# Patient Record
Sex: MALE | Race: WHITE | NOT HISPANIC OR LATINO | Employment: FULL TIME | URBAN - METROPOLITAN AREA
[De-identification: names, ages, dates, MRNs, and addresses within clinical notes are randomized per-mention and may not be internally consistent; named-entity substitution may affect disease eponyms.]

---

## 2019-03-28 ENCOUNTER — OFFICE VISIT (OUTPATIENT)
Dept: SLEEP MEDICINE | Facility: HOSPITAL | Age: 29
End: 2019-03-28

## 2019-03-28 VITALS
HEART RATE: 114 BPM | BODY MASS INDEX: 39.39 KG/M2 | SYSTOLIC BLOOD PRESSURE: 113 MMHG | HEIGHT: 67 IN | WEIGHT: 251 LBS | DIASTOLIC BLOOD PRESSURE: 73 MMHG

## 2019-03-28 DIAGNOSIS — R06.83 SNORING: ICD-10-CM

## 2019-03-28 DIAGNOSIS — G47.10 HYPERSOMNIA: Primary | ICD-10-CM

## 2019-03-28 DIAGNOSIS — Z02.4 ENCOUNTER FOR CDL (COMMERCIAL DRIVING LICENSE) EXAM: ICD-10-CM

## 2019-03-28 DIAGNOSIS — G47.26 SHIFT WORK SLEEP DISORDER: ICD-10-CM

## 2019-03-28 DIAGNOSIS — E66.9 OBESITY (BMI 30-39.9): ICD-10-CM

## 2019-03-28 PROCEDURE — G0463 HOSPITAL OUTPT CLINIC VISIT: HCPCS

## 2019-03-28 NOTE — PROGRESS NOTES
"[facility]  Telephone: [phone] / Fax: [phone] [place]  Telephone: [phone] / Fax: [phone] Anupama Victor    Referring Physician: Provider, No Known  PCP: Provider, No Known    Reason for consult:  sleep apnea    Uriel Mathew is a 29 y.o.male  was seen in the Sleep Disorders Center today for evaluation of sleep apnea.  He was tested for FAB 10 yrs ago at [facility] over 20 per hour. He used the machine for about 1 year. He stopped the machine due to excessive pressures. He had FFM. Since then he gained about 20 lbs. 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  His ESS is 8    Over the years he had some episodes of sleepiness. However, most of the time he feels rested. He gets about 6 hours of sleep per night. He sleeps from 6pm-MN. He starts his job at 1am. He drives locally. He snores in all positions which appear to be loud. He denies gasping respirations or choking.  He feels better if he increases sleep time.    Social history, , former smoker age [age], drinks 2 caffeinated beverages a day.    Review of systems unremarkable    Uriel Mathew     Current Medications:  No current outpatient medications on file.    I have reviewed Past Medical History, Past Surgical History, Medication List, Social History and Family History as entered in Sleep Questionnaire and EPIC.    ESS  8   Vital Signs /73 (BP Location: Left arm, Patient Position: Sitting)   Pulse 114   Ht 170.2 cm (67\")   Wt 114 kg (251 lb)   BMI 39.31 kg/m²  Body mass index is 39.31 kg/m².    General Alert and oriented. No acute distress noted   Pharynx/Throat Class II Mallampati airway, large tongue, no evidence of redundant lateral pharyngeal tissue. No oral lesions. No thrush. Moist mucous membranes.   Head Normocephalic. Symmetrical. Atraumatic.    Nose No septal deviation. No drainage   Chest Wall " Normal shape. Symmetric expansion with respiration. No tenderness.   Neck Trachea midline, no thyromegaly or adenopathy    Lungs Clear to auscultation bilaterally. No wheezes. No rhonchi. No rales. Respirations regular, even and unlabored.   Heart Regular rhythm and normal rate. Normal S1 and S2. No murmur   Abdomen Soft, non-tender and non-distended. Normal bowel sounds. No masses.   Extremities Moves all extremities well. No edema   Psychiatric Normal mood and affect.             .     Impression:  1. Hypersomnia    2. Snoring    3. Obesity (BMI 30-39.9)    4. Encounter for CDL (commercial driving license) exam    5. Shift work sleep disorder          Plan:  I discussed the pathophysiology of obstructive sleep apnea with the patient.  We discussed the adverse outcomes associated with untreated sleep-disordered breathing.  We discussed treatment modalities of obstructive sleep apnea including CPAP device as well as oral mandibular advancement device. Sleep study will be scheduled to establish definitive diagnosis of sleep disorder breathing.  Weight loss will be strongly beneficial in order to reduce the severity of sleep-disordered breathing.  Patient has narrow oropharyngeal structure.  Caution during activities that require prolonged concentration is strongly advised.  Patient will be notified of sleep study results after sleep study is completed.  If sleep apnea is only mild,  oral mandibular advancement device may be one of the treatment options.  However if sleep apnea is moderately severe, CPAP treatment will be strongly encouraged.  The patient is not opposed to treatment with CPAP device if we confirm significant obstructive sleep apnea on polysomnography.  I discussed with the patient the importance of obstructive sleep apnea treatment as he has  license.      Thank you for allowing me to participate in your patient's care.    The patient will follow up with Dr. Mauricio after completion of  RITCHIE Brink  [facility]  Phone: [phone]      Part of this note may be an electronic transcription/translation of spoken language to printed text using the Dragon Dictation System. Some errors may exist even though the document was edited.

## 2019-04-16 ENCOUNTER — HOSPITAL ENCOUNTER (OUTPATIENT)
Dept: SLEEP MEDICINE | Facility: HOSPITAL | Age: 29
Discharge: HOME | End: 2019-04-16
Admitting: NURSE PRACTITIONER

## 2019-04-16 DIAGNOSIS — G47.10 HYPERSOMNIA: ICD-10-CM

## 2019-04-16 DIAGNOSIS — E66.9 OBESITY (BMI 30-39.9): ICD-10-CM

## 2019-04-16 DIAGNOSIS — R06.83 SNORING: ICD-10-CM

## 2019-04-16 PROCEDURE — 95806 SLEEP STUDY UNATT&RESP EFFT: CPT

## 2019-04-24 ENCOUNTER — TELEPHONE (OUTPATIENT)
Dept: SLEEP MEDICINE | Facility: HOSPITAL | Age: 29
End: 2019-04-24

## 2019-05-30 ENCOUNTER — OFFICE VISIT (OUTPATIENT)
Dept: SLEEP MEDICINE | Facility: HOSPITAL | Age: 29
End: 2019-05-30

## 2019-05-30 VITALS
HEART RATE: 82 BPM | HEIGHT: 67 IN | SYSTOLIC BLOOD PRESSURE: 122 MMHG | OXYGEN SATURATION: 97 % | DIASTOLIC BLOOD PRESSURE: 70 MMHG | WEIGHT: 241 LBS | BODY MASS INDEX: 37.83 KG/M2

## 2019-05-30 DIAGNOSIS — G47.26 SHIFT WORK SLEEP DISORDER: ICD-10-CM

## 2019-05-30 DIAGNOSIS — Z02.4 ENCOUNTER FOR CDL (COMMERCIAL DRIVING LICENSE) EXAM: ICD-10-CM

## 2019-05-30 DIAGNOSIS — R06.83 SNORING: Primary | ICD-10-CM

## 2019-05-30 DIAGNOSIS — E66.9 OBESITY (BMI 30-39.9): ICD-10-CM

## 2019-05-30 PROCEDURE — G0463 HOSPITAL OUTPT CLINIC VISIT: HCPCS

## 2019-05-30 NOTE — PROGRESS NOTES
"[facility]  Telephone: [phone] / Fax: [phone] [place]  Telephone: [phone] / Fax: [phone] Anupama Victor    PCP: Provider, No Known    Reason for visit: FAB f/u    Uriel Mathew is a 29 y.o.male  was last seen at [facility] sleep lab in March 2019 and had HST in April 2019. It showed no FAB. 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 He denies EDS on the road.  He denies other health changes.  His sleep schedule is 6 PM-1 AM.  He works nights.  His ESS is 1.    Social history, no tobacco, drinks 1 energy drink, 1 caffeine.    Review of systems, negative.    Current Medications:  No current outpatient medications on file.   also entered in Sleep Questionnaire    Patient  has no past medical history on file.    I have reviewed the Past Medical History, Past Surgical History, Social History and Family History.            ESS  1   Vital Signs /70   Pulse 82   Ht 170.2 cm (67\")   Wt 109 kg (241 lb)   SpO2 97%   BMI 37.75 kg/m²  Body mass index is 37.75 kg/m².    General Alert and oriented. No acute distress noted   Pharynx/Throat Class II Mallampati airway, large tongue, no evidence of redundant lateral pharyngeal tissue. No oral lesions. No thrush. Moist mucous membranes.   Head Normocephalic. Symmetrical. Atraumatic.    Nose No septal deviation. No drainage   Chest Wall Normal shape. Symmetric expansion with respiration. No tenderness.   Neck Trachea midline, no thyromegaly or adenopathy    Lungs Clear to auscultation bilaterally. No wheezes. No rhonchi. No rales. Respirations regular, even and unlabored.   Heart Regular rhythm and normal rate. Normal S1 and S2. No murmur   Abdomen Soft, non-tender and non-distended. Normal bowel sounds. No masses.   Extremities Moves all extremities well. No edema   Psychiatric Normal mood and affect.     Testing:  Diagnostic findings: HST 4/18/19- The patient tolerated the home sleep testing with " monitoring time of 286 minutes. The data obtained make this a technically adequate study. The apnea hypopneas index(AHI) was 2.9 per sleep hour.  The AHI during supine position was 3.4 per sleep hour. Mean heart rate of 80 BPM.  Snoring was noted 25% of sleep time. Lowest oxygen saturation during the study was 88%. Saturation below 89% was noted for 0 mins.     Impression:  1. Snoring    2. Obesity (BMI 30-39.9)    3. Encounter for CDL (commercial driving license) exam    4. Shift work sleep disorder          Plan:  I discussed results of home sleep study with the patient.  He has no sleep apnea or sleep-related hypoxemia.  He works nights and needs to exercise extreme caution during activities that require prolonged concentration such as driving especially at night.  We have no objection for him to continue maintaining  license.  I have given him a statement outlining this to provide to the DOT.  I have also given him a copy of sleep study report to keep for his records       Follow up with Dr. Hang TORRES    Thank you for allowing me to participate in your patient's care.      RITCHIE Disla  [facility]  Phone: [phone]      Part of this note may be an electronic transcription/translation of spoken language to printed text using the Dragon Dictation System.

## 2021-07-30 ENCOUNTER — OFFICE VISIT (OUTPATIENT)
Dept: INTERNAL MEDICINE | Facility: CLINIC | Age: 31
End: 2021-07-30

## 2021-07-30 VITALS
TEMPERATURE: 98.6 F | HEART RATE: 94 BPM | BODY MASS INDEX: 38.61 KG/M2 | OXYGEN SATURATION: 96 % | DIASTOLIC BLOOD PRESSURE: 70 MMHG | SYSTOLIC BLOOD PRESSURE: 114 MMHG | HEIGHT: 67 IN | RESPIRATION RATE: 18 BRPM | WEIGHT: 246 LBS

## 2021-07-30 DIAGNOSIS — Z00.00 ENCOUNTER FOR WELLNESS EXAMINATION IN ADULT: Primary | ICD-10-CM

## 2021-07-30 PROCEDURE — 99395 PREV VISIT EST AGE 18-39: CPT | Performed by: NURSE PRACTITIONER

## 2021-07-30 RX ORDER — ALBUTEROL SULFATE 90 UG/1
2 AEROSOL, METERED RESPIRATORY (INHALATION)
COMMUNITY
Start: 2021-02-02 | End: 2021-07-30

## 2021-07-30 NOTE — PROGRESS NOTES
"ANUP Trevino is a 31 y.o. male presenting for Annual Exam and Abdominal Pain (Needs referral to gastro )  He is a new patient to me.    His current/chronic health conditions include:  There is no problem list on file for this patient.      Current Outpatient Medications on File Prior to Visit   Medication Sig   • [DISCONTINUED] albuterol sulfate  (90 Base) MCG/ACT inhaler Inhale 2 puffs.     No current facility-administered medications on file prior to visit.            Health Habits:  Nutrition: Trying to get back on track  Exercise: 0 times/week.  Current exercise activities include: none    Screenings:  Dental Exam: NUTD  Eye Exam: UTD  PSA: n/a  Colon Cancer: n/a  Tob use: briefly, previously      Complaints today include:  He c/o abd discomfort and indigestion. Denies GERD/N/V. Rare diarrhea.This was better when he was eating better.    The patient's allergies, current medications, problem list, past medical history, past family history, past medical history, and past social history were reviewed and updated as appropriate.    Review of Systems   Constitutional: Negative.    HENT: Negative.    Eyes: Negative.    Respiratory: Negative.    Cardiovascular: Negative.    Gastrointestinal: Positive for diarrhea. Negative for abdominal pain, nausea and vomiting.        Indigestion   Endocrine: Negative.    Genitourinary: Negative.    Musculoskeletal: Negative.    Skin: Negative.    Allergic/Immunologic: Negative.    Neurological: Negative.    Hematological: Negative.    Psychiatric/Behavioral: Negative.        OBJECTIVE    Vitals:    07/30/21 1438   BP: 114/70   Pulse: 94   Resp: 18   Temp: 98.6 °F (37 °C)   TempSrc: Infrared   SpO2: 96%   Weight: 112 kg (246 lb)   Height: 170.2 cm (67.01\")       BP Readings from Last 3 Encounters:   07/30/21 114/70   05/30/19 122/70   03/28/19 113/73       Wt Readings from Last 3 Encounters:   07/30/21 112 kg (246 lb)   05/30/19 109 kg (241 lb)   03/28/19 114 kg (251 " lb)       Body mass index is 38.52 kg/m².  Nursing notes and vital signs reviewed.    Physical Exam  Constitutional:       General: He is not in acute distress.     Appearance: Normal appearance. He is well-developed.   HENT:      Head: Normocephalic.      Right Ear: Hearing, tympanic membrane, ear canal and external ear normal.      Left Ear: Hearing, tympanic membrane, ear canal and external ear normal.      Nose: Nose normal. No mucosal edema or rhinorrhea.      Mouth/Throat:      Mouth: Mucous membranes are moist.      Pharynx: Oropharynx is clear. Uvula midline.   Eyes:      General: Lids are normal.      Extraocular Movements: Extraocular movements intact.      Conjunctiva/sclera: Conjunctivae normal.      Pupils: Pupils are equal, round, and reactive to light.   Neck:      Thyroid: No thyroid mass or thyromegaly.   Cardiovascular:      Rate and Rhythm: Regular rhythm.      Pulses: Normal pulses.      Heart sounds: S1 normal and S2 normal. No murmur heard.   No friction rub. No gallop.    Pulmonary:      Effort: Pulmonary effort is normal.      Breath sounds: Normal breath sounds. No wheezing, rhonchi or rales.   Abdominal:      General: Bowel sounds are normal.      Palpations: Abdomen is soft.      Tenderness: There is no abdominal tenderness. There is no guarding.      Hernia: No hernia is present.   Musculoskeletal:         General: No deformity. Normal range of motion.      Cervical back: Normal range of motion and neck supple.   Lymphadenopathy:      Cervical: No cervical adenopathy.   Skin:     General: Skin is warm and dry.      Findings: No lesion or rash.   Neurological:      General: No focal deficit present.      Mental Status: He is alert and oriented to person, place, and time.      Cranial Nerves: No cranial nerve deficit.      Sensory: No sensory deficit.      Motor: Motor function is intact.      Coordination: Coordination is intact.      Gait: Gait normal.      Deep Tendon Reflexes: Reflexes  are normal and symmetric.   Psychiatric:         Attention and Perception: He is attentive.         Mood and Affect: Mood and affect normal.         Speech: Speech normal.         Behavior: Behavior normal.         Thought Content: Thought content normal.           No results found for this or any previous visit (from the past 672 hour(s)).      ASSESSMENT AND PLAN    Diagnoses and all orders for this visit:    1. Encounter for wellness examination in adult (Primary)  -     CBC (No Diff)  -     Comprehensive Metabolic Panel  -     Hepatitis C Antibody  -     Lipid Panel With / Chol / HDL Ratio  -     Thyroid Cascade Profile  -     Vitamin D 25 Hydroxy          Preventative counseling completed including relevant screenings, appropriate vaccinations, healthy nutrition, and appropriate physical activity.  Patient's Body mass index is 38.52 kg/m². indicating that he is obese (BMI >30). Obesity-related health conditions include the following: none. Obesity is newly identified. BMI is is above average; BMI management plan is completed. We discussed portion control and increasing exercise..          The plan of care was discussed. All questions were answered. Patient verbalized understanding.        Return for fasting labs ASAP.

## 2021-09-17 ENCOUNTER — HOSPITAL ENCOUNTER (OUTPATIENT)
Dept: GENERAL RADIOLOGY | Facility: HOSPITAL | Age: 31
Discharge: HOME | End: 2021-09-17
Admitting: NURSE PRACTITIONER

## 2021-09-17 DIAGNOSIS — M79.652 LEFT THIGH PAIN: ICD-10-CM

## 2021-09-17 PROCEDURE — 73552 X-RAY EXAM OF FEMUR 2/>: CPT

## 2022-01-07 ENCOUNTER — TELEMEDICINE (OUTPATIENT)
Dept: FAMILY MEDICINE CLINIC | Facility: TELEHEALTH | Age: 32
End: 2022-01-07

## 2022-01-07 DIAGNOSIS — Z20.822 CLOSE EXPOSURE TO COVID-19 VIRUS: ICD-10-CM

## 2022-01-07 DIAGNOSIS — J06.9 VIRAL UPPER RESPIRATORY TRACT INFECTION: Primary | ICD-10-CM

## 2022-01-07 PROCEDURE — 99213 OFFICE O/P EST LOW 20 MIN: CPT | Performed by: NURSE PRACTITIONER

## 2022-01-07 NOTE — PATIENT INSTRUCTIONS
Symptoms may worsen over the next several days.   I have included a COVID-19 test. Go to your nearest [facility] for testing. Tell them you have already been seen virtually and only need testing   We will notify you of your COVID-19 results by phone. You will receive a call from an unknown or blocked number. You can also get your results on your Momail darius.  You will need to remain quarantined for 10 days from onset of symptoms and only to discontinue if symptoms have improved and no fever for 24 hours without the use of fever reducing medications such as Tylenol (acetaminophen), Advil (ibuprfen), or Aleve (naproxen).  Continue to wear a mask for 14 days or until symptoms resolve. If symptoms worsen, go to Urgent Care or Emergency Department for care.   Symptoms of Coronavirus are treated just as you would for any viral illness. Take Tylenol and/or Ibuprofen for pain and/or fever, you may find it better to alternate these every 4 hours, so that you are only taking each every 8 hours. Stay hydrated, rest and you may treat cough with over-the-counter cough syrup such as Robitussin.  If your symptoms do not improve, symptoms change or do not fully resolve, seek further evaluation with your primary care provider or Urgent Care. If you develop severe symptoms, such as chest pain, high fever, difficulty breathing, difficulty urinating or have any symptoms that interfere with your ability to function go to the nearest Emergency Department immediately.     Upper Respiratory Infection, Adult  An upper respiratory infection (URI) is a common viral infection of the nose, throat, and upper air passages that lead to the lungs. The most common type of URI is the common cold. URIs usually get better on their own, without medical treatment.  What are the causes?  A URI is caused by a virus. You may catch a virus by:  · Breathing in droplets from an infected person's cough or sneeze.  · Touching something that has  been exposed to the virus (contaminated) and then touching your mouth, nose, or eyes.  What increases the risk?  You are more likely to get a URI if:  · You are very young or very old.  · It is noemi or winter.  · You have close contact with others, such as at a , school, or health care facility.  · You smoke.  · You have long-term (chronic) heart or lung disease.  · You have a weakened disease-fighting (immune) system.  · You have nasal allergies or asthma.  · You are experiencing a lot of stress.  · You work in an area that has poor air circulation.  · You have poor nutrition.  What are the signs or symptoms?  A URI usually involves some of the following symptoms:  · Runny or stuffy (congested) nose.  · Sneezing.  · Cough.  · Sore throat.  · Headache.  · Fatigue.  · Fever.  · Loss of appetite.  · Pain in your forehead, behind your eyes, and over your cheekbones (sinus pain).  · Muscle aches.  · Redness or irritation of the eyes.  · Pressure in the ears or face.  How is this diagnosed?  This condition may be diagnosed based on your medical history and symptoms, and a physical exam. Your health care provider may use a cotton swab to take a mucus sample from your nose (nasal swab). This sample can be tested to determine what virus is causing the illness.  How is this treated?  URIs usually get better on their own within 7-10 days. You can take steps at home to relieve your symptoms. Medicines cannot cure URIs, but your health care provider may recommend certain medicines to help relieve symptoms, such as:  · Over-the-counter cold medicines.  · Cough suppressants. Coughing is a type of defense against infection that helps to clear the respiratory system, so take these medicines only as recommended by your health care provider.  · Fever-reducing medicines.  Follow these instructions at home:  Activity  · Rest as needed.  · If you have a fever, stay home from work or school until your fever is gone or until your  health care provider says you are no longer contagious. Your health care provider may have you wear a face mask to prevent your infection from spreading.  Relieving symptoms  · Gargle with a salt-water mixture 3-4 times a day or as needed. To make a salt-water mixture, completely dissolve ½-1 tsp of salt in 1 cup of warm water.  · Use a cool-mist humidifier to add moisture to the air. This can help you breathe more easily.  Eating and drinking    · Drink enough fluid to keep your urine pale yellow.  · Eat soups and other clear broths.    General instructions    · Take over-the-counter and prescription medicines only as told by your health care provider. These include cold medicines, fever reducers, and cough suppressants.  · Do not use any products that contain nicotine or tobacco, such as cigarettes and e-cigarettes. If you need help quitting, ask your health care provider.  · Stay away from secondhand smoke.  · Stay up to date on all immunizations, including the yearly (annual) flu vaccine.  · Keep all follow-up visits as told by your health care provider. This is important.    How to prevent the spread of infection to others    · URIs can be passed from person to person (are contagious). To prevent the infection from spreading:  ? Wash your hands often with soap and water. If soap and water are not available, use hand .  ? Avoid touching your mouth, face, eyes, or nose.  ? Cough or sneeze into a tissue or your sleeve or elbow instead of into your hand or into the air.    Contact a health care provider if:  · You are getting worse instead of better.  · You have a fever or chills.  · Your mucus is brown or red.  · You have yellow or brown discharge coming from your nose.  · You have pain in your face, especially when you bend forward.  · You have swollen neck glands.  · You have pain while swallowing.  · You have white areas in the back of your throat.  Get help right away if:  · You have shortness of  breath that gets worse.  · You have severe or persistent:  ? Headache.  ? Ear pain.  ? Sinus pain.  ? Chest pain.  · You have chronic lung disease along with any of the following:  ? Wheezing.  ? Prolonged cough.  ? Coughing up blood.  ? A change in your usual mucus.  · You have a stiff neck.  · You have changes in your:  ? Vision.  ? Hearing.  ? Thinking.  ? Mood.  Summary  · An upper respiratory infection (URI) is a common infection of the nose, throat, and upper air passages that lead to the lungs.  · A URI is caused by a virus.  · URIs usually get better on their own within 7-10 days.  · Medicines cannot cure URIs, but your health care provider may recommend certain medicines to help relieve symptoms.  This information is not intended to replace advice given to you by your health care provider. Make sure you discuss any questions you have with your health care provider.  Document Revised: 12/26/2019 Document Reviewed: 08/03/2018  [employer] Patient Education © 2021 [employer]

## 2022-01-07 NOTE — PROGRESS NOTES
You have chosen to receive care through a telehealth visit.  Do you consent to use a video/audio connection for your medical care today? Yes     CHIEF COMPLAINT  No chief complaint on file.        HPI  Uriel Mathew is a 31 y.o. male  presents with complaint of   His son has COVID-19.     Review of Systems   Constitutional: Positive for fatigue. Negative for chills, diaphoresis and fever.   HENT: Positive for congestion, rhinorrhea, sinus pressure and sore throat. Negative for sinus pain and sneezing.    Respiratory: Negative for cough, chest tightness, shortness of breath and wheezing.    Cardiovascular: Negative for chest pain.   Gastrointestinal: Negative for diarrhea, nausea and vomiting.   Musculoskeletal: Positive for myalgias.   Neurological: Positive for headaches.       History reviewed. No pertinent past medical history.    Family History   Problem Relation Age of Onset   • Parkinsonism Maternal Grandmother    • No Known Problems Mother    • No Known Problems Father    • Dementia Maternal Grandfather        Social History     Socioeconomic History   • Marital status:    Tobacco Use   • Smoking status: Former Smoker   • Smokeless tobacco: Never Used   • Tobacco comment: Smoked for 2 years   Vaping Use   • Vaping Use: Never used   Substance and Sexual Activity   • Alcohol use: Yes     Comment: socially   • Drug use: Not Currently     Types: Marijuana     Comment: College   • Sexual activity: Yes     Partners: Female     Birth control/protection: Condom         There were no vitals taken for this visit.    PHYSICAL EXAM  Physical Exam   Constitutional: He is oriented to person, place, and time. He appears well-developed and well-nourished. He does not have a sickly appearance. He does not appear ill. No distress.   HENT:   Head: Normocephalic and atraumatic.   Eyes: EOM are normal.   Neck: Neck normal appearance.  Pulmonary/Chest: Effort normal.  No respiratory distress.  Neurological: He is alert and  oriented to person, place, and time.   Skin: Skin is dry.   Psychiatric: He has a normal mood and affect.       No results found for this or any previous visit.    Diagnoses and all orders for this visit:    1. Viral upper respiratory tract infection (Primary)  -     COVID-19,[facility] ROUTINE, NP/OP SWAB IN TRANSPORT MEDIA OR ESWAB 72 HR TAT - Swab, Nasopharynx; Future  -     QUESTIONNAIRE SERIES    2. Close exposure to COVID-19 virus  -     COVID-19,[facility] ROUTINE, NP/OP SWAB IN TRANSPORT MEDIA OR ESWAB 72 HR TAT - Swab, Nasopharynx; Future  -     QUESTIONNAIRE SERIES      COVID-19 (PFIZER) 4/21/2021, 3/25/2021         FOLLOW-UP  As discussed during visit with PCP/[facility] if no improvement or Urgent Care/Emergency Department if worsening of symptoms    Patient verbalizes understanding of medication dosage, comfort measures, instructions for treatment and follow-up.    Liset Nichols, RITCHIE  01/07/2022  11:13 EST    This visit was performed via Telehealth.  This patient has been instructed to follow-up with their primary care provider if their symptoms worsen or the treatment provided does not resolve their illness.

## 2022-03-10 ENCOUNTER — OFFICE VISIT (OUTPATIENT)
Dept: INTERNAL MEDICINE | Facility: CLINIC | Age: 32
End: 2022-03-10

## 2022-03-10 VITALS
SYSTOLIC BLOOD PRESSURE: 129 MMHG | OXYGEN SATURATION: 97 % | BODY MASS INDEX: 39.96 KG/M2 | HEIGHT: 67 IN | WEIGHT: 254.6 LBS | DIASTOLIC BLOOD PRESSURE: 73 MMHG | HEART RATE: 86 BPM | TEMPERATURE: 97.8 F

## 2022-03-10 DIAGNOSIS — S39.012A STRAIN OF LUMBAR REGION, INITIAL ENCOUNTER: Primary | ICD-10-CM

## 2022-03-10 PROCEDURE — 99213 OFFICE O/P EST LOW 20 MIN: CPT | Performed by: NURSE PRACTITIONER

## 2022-03-10 RX ORDER — METHYLPREDNISOLONE 4 MG/1
TABLET ORAL
Qty: 21 EACH | Refills: 0 | Status: SHIPPED | OUTPATIENT
Start: 2022-03-10 | End: 2022-06-20

## 2022-03-10 RX ORDER — CYCLOBENZAPRINE HCL 5 MG
5 TABLET ORAL 3 TIMES DAILY PRN
Qty: 30 TABLET | Refills: 0 | Status: SHIPPED | OUTPATIENT
Start: 2022-03-10 | End: 2022-03-20

## 2022-03-10 NOTE — PROGRESS NOTES
"Uriel Mathew is a 32 y.o. male presenting today for   Chief Complaint   Patient presents with   • Back Pain     Injured 3/9/22 when picking up a heavy object at work.       Subjective    History of Present Illness     He was setting down a display rack, <20#, and then upon standing felt a sudden grabbing in his lower back. This occurred yesterday in the afternoon and has been progressively worsening. He has tried ice and ibu.  He has prior h/o lumbar pain. No prior x-rays or PT.  No LE paresthesia or saddle anesthesia.    The following portions of the patient's history were reviewed and updated as appropriate: allergies, current medications, problem list, past medical history, past surgical history, family history, and social history.          Objective    Vitals:    03/10/22 1049   BP: 129/73   Pulse: 86   Temp: 97.8 °F (36.6 °C)   TempSrc: Temporal   SpO2: 97%   Weight: 115 kg (254 lb 9.6 oz)   Height: 170.2 cm (67\")     Body mass index is 39.88 kg/m².  Nursing notes and vitals reviewed.    Physical Exam  Constitutional:       General: He is not in acute distress.     Appearance: He is well-developed.   Pulmonary:      Effort: Pulmonary effort is normal. No respiratory distress.   Musculoskeletal:      Cervical back: Normal.      Thoracic back: Normal.      Lumbar back: No swelling, deformity or tenderness. Decreased range of motion.   Neurological:      Mental Status: He is alert and oriented to person, place, and time.   Psychiatric:         Speech: Speech normal.         Thought Content: Thought content normal.           Assessment and Plan    Diagnoses and all orders for this visit:    1. Strain of lumbar region, initial encounter (Primary)  -     XR Spine Lumbar 4+ View  -     methylPREDNISolone (MEDROL) 4 MG dose pack; Take as directed on package instructions.  Dispense: 21 each; Refill: 0  -     cyclobenzaprine (FLEXERIL) 5 MG tablet; Take 1 tablet by mouth 3 (Three) Times a Day As Needed for Muscle " Spasms for up to 10 days.  Dispense: 30 tablet; Refill: 0  -     Ambulatory Referral to Physical Therapy            Medications, including side effects, were discussed with the patient. Patient verbalized understanding.  The plan of care was discussed. All questions were answered. Patient verbalized understanding.        Return if symptoms worsen or fail to improve.

## 2022-03-21 ENCOUNTER — HOSPITAL ENCOUNTER (OUTPATIENT)
Dept: PHYSICAL THERAPY | Facility: HOSPITAL | Age: 32
Setting detail: THERAPIES SERIES
Discharge: HOME | End: 2022-03-21

## 2022-03-21 DIAGNOSIS — S39.012A STRAIN OF LUMBAR REGION, INITIAL ENCOUNTER: Primary | ICD-10-CM

## 2022-03-21 PROCEDURE — 97161 PT EVAL LOW COMPLEX 20 MIN: CPT | Performed by: PHYSICAL THERAPIST

## 2022-03-21 NOTE — THERAPY EVALUATION
Outpatient Physical Therapy Ortho Initial Evaluation   [place]     Patient Name: Uriel Mathew  : 1990  MRN: [id number]  Today's Date: 3/21/2022      Visit Date: 2022    There is no problem list on file for this patient.       No past medical history on file.     Past Surgical History:   Procedure Laterality Date   • INGUINAL HERNIA REPAIR Left 2003   • TONSILLECTOMY AND ADENOIDECTOMY         Visit Dx:     ICD-10-CM ICD-9-CM   1. Strain of lumbar region, initial encounter  S39.012A 847.2          Patient History     Row Name 22 0900 22 0730          History    Chief Complaint Muscle weakness  -GC Muscle weakness  -GC (r) patient (t)     Type of Pain Back pain  -GC Ankle pain;Back pain  -GC (r) patient (t)     Date Current Problem(s) Began 03/10/22  -GC 03/10/22  -GC (r) patient (t)     Brief Description of Current Complaint Pt states he was moving furniture at work several weeks ago when he exeperienced pain acorss his low back. He denies any referred pain into his LEs. Pt was seen by RITCHIE Mack who placed him on a muscle relaxant but the pt reports he is not taking it regularly. He is also referred to therapy.  -GC Lower back pain  -GC (r) patient (t)     Patient/Caregiver Goals Relieve pain;Improve mobility;Improve strength  -GC Relieve pain;Improve mobility;Improve strength  -GC (r) patient (t)     Patient's Rating of General Health Good  -GC --     Hand Dominance right-handed  -GC right-handed  -GC (r) patient (t)     Occupation/sports/leisure activities  at a furniture store, disc golf  -GC  at a furniture store, disc golf  -GC (r) patient (t)     Patient seeing anyone else for problem(s)? No  -GC No  -GC (r) patient (t)            Pain     Pain Location Back  -GC --     Pain at Present 0  -GC --     Pain at Best 0  -GC --     Pain at Worst 8  -GC --     Pain Frequency Intermittent  -GC --     Pain Description  Aching;Discomfort;Sore;Tender;Tightness  -GC --     Difficulties at work? Pt has difficulty with lifting tasks  -GC --     Difficulties with ADL's? Pt has pain with activities that inolve bending forward  -GC --            Fall Risk Assessment    Any falls in the past year: No  -GC No  -GC (r) patient (t)            Services    Prior Rehab/Home Health Experiences No  -GC No  -GC (r) patient (t)     Are you currently receiving Home Health services No  -GC No  -GC (r) patient (t)     Do you plan to receive Home Health services in the near future No  -GC No  -GC (r) patient (t)            Daily Activities    Primary Language English  -GC English  -GC (r) patient (t)     How does patient learn best? Demonstration  -GC Demonstration  -GC (r) patient (t)     Teaching needs identified Home Exercise Program;Management of Condition  -GC --     Patient is concerned about/has problems with Grasping objects lifting;Performing home management (household chores, shopping, care of dependents);Performing job responsibilities/community activities (work, school,;Performing sports, recreation, and play activities  -GC --     Does patient have problems with the following? Anxiety  -GC --     Barriers to learning None  -GC --     Pt Participated in POC and Goals Yes  -GC --            Safety    Are you being hurt, hit, or frightened by anyone at home or in your life? No  -GC No  -GC (r) patient (t)     Are you being neglected by a caregiver No  -GC No  -GC (r) patient (t)     Have you had any of the following issues with Anxiety  -GC Anxiety  -GC (r) patient (t)           User Key  (r) = Recorded By, (t) = Taken By, (c) = Cosigned By    Initials Name Provider Type    GC Mega Yip, PT Physical Therapist    patient Uriel Mathew --                 PT Ortho     Row Name 03/21/22 0900       Posture/Observations    Posture/Observations Comments Pt has decreased lordosis, no lateral shift  -GC       DTR- Lower Quarter Clearing     Patellar tendon (L2-4) Bilateral:;2- Normal response  -GC    Achilles tendon (S1-2) Bilateral:;2- Normal response  -GC       Sensory Screen for Light Touch- Lower Quarter Clearing    L1 (inguinal area) Bilateral:;Intact  -GC    L2 (anterior mid thigh) Bilateral:;Intact  -GC    L3 (distal anterior thigh) Bilateral:;Intact  -GC    L4 (medial lower leg/foot) Bilateral:;Intact  -GC    L5 (lateral lower leg/great toe) Bilateral:;Intact  -GC    S1 (bottom of foot) Bilateral:;Intact  -GC       Lumbosacral Accessory Motions    PA [place]- L1 WNL  -GC    PA [place]- L2 WNL  -GC    PA [place]- L3 WNL  -GC    PA Glide- L4 Hypomobile  -GC    PA Glide- L5 Hypomobile  -GC    PA glide- Sacral base Hypomobile  -GC       Lumbar/SI Special Tests    SLR (Neural Tension) Bilateral:;Negative  -GC    JHONY (hip vs. SI Dysfunction) Bilateral:;Negative  -GC       Lumbosacral Palpation    SI Bilateral:;Tender  -GC    Lumbosacral Segment Tender  L4, L5  -GC    Quadratus Lumborum Bilateral:;Tender;Guarded/taut  -GC    Erector Spinae (Paraspinals) Bilateral:;Tender;Guarded/taut  -GC       Head/Neck/Trunk    Trunk Extension AROM 50% range with pain  -GC    Trunk Flexion AROM 50% range with pain  -GC    Trunk Lt Lateral Flexion AROM 75% range  -GC    Trunk Rt Lateral Flexion AROM 50% range with pain  -GC    Trunk Lt Rotation AROM WFL  -GC    Trunk Rt Rotation AROM 50% range with pain  -GC       MMT Neck/Trunk    Trunk Flexion MMT, Gross Movement (5/5) normal  -GC    Trunk Extension MMT, Gross Movement (4/5) good  -GC       Lower Extremity Flexibility    Hamstrings Bilateral:;Moderately limited  -GC    Hip Flexors Bilateral:;Mildly limited  -GC    Hip External Rotators Bilateral:;Moderately limited  -GC    Hip Internal Rotators Bilateral:;Mildly limited  -GC    Quadratus Lumborum Bilateral:;Moderately limited  -GC       Transfers    Comment, (Transfers) Pt is independent with all bed mobility and transfers  -GC       Gait/Stairs (Locomotion)     Comment, (Gait/Stairs) Pt ambulates normally on level surfaces  -          User Key  (r) = Recorded By, (t) = Taken By, (c) = Cosigned By    Initials Name Provider Type     Mega Yip PT Physical Therapist                            Therapy Education  Given: HEP, Symptoms/condition management, Pain management, Posture/body mechanics  Program: New  How Provided: Verbal, Demonstration, Written  Provided to: Patient  Level of Understanding: Teach back education performed, Verbalized, Demonstrated      PT OP Goals     Row Name 03/21/22 0900          PT Short Term Goals    STG Date to Achieve 04/04/22  -     STG 1 Decrease LBP to 3-4/10 with activity.  -     STG 2 Increase trunk ROM to at least 75% range all planes with testing.  -GC     STG 3 Increase hamstring, piriformis, and quadratus flexibility to no more than a minimal restriciton with testing.  -     STG 4 Pt will be independent with his HEP isseud by this therapist.  -            Long Term Goals    LTG Date to Achieve 04/18/22  -     LTG 1 Decrease LBP to 0-1/10 with activity.  -     LTG 2 Increase trunk ROM to WFL all planes with testing.  -GC     LTG 3 Increase hamstring, piriformis, and quadratus flexibility to WFL with testing.  -     LTG 4 Increase trunk strength to 5/5 all planes with testing.  -     LTG 5 Pt will be independent with all ADLs without pain.  -            Time Calculation    PT Goal Re-Cert Due Date 04/18/22  -           User Key  (r) = Recorded By, (t) = Taken By, (c) = Cosigned By    Initials Name Provider Type     Mega Yip PT Physical Therapist                 PT Assessment/Plan     Row Name 03/21/22 0900          PT Assessment    Functional Limitations Limitation in home management;Limitations in community activities;Limitations in functional capacity and performance;Performance in leisure activities;Performance in work activities  -     Impairments Impaired flexibility;Range of motion;Pain;Muscle  strength;Joint mobility  -     Assessment Comments Pt presents with a several week history of LBP as a result of a lifitn injury. He has back that he rates up to 8/10 with lifting tasks. He has decreased spinal mobility, decreased trunk ROM, decreased trunk adn LE flexibility, decreased trunk strength, and decreased function secondary to the above.  -     Please refer to paper survey for additional self-reported information Yes  -GC     Rehab Potential Good  -GC     Patient/caregiver participated in establishment of treatment plan and goals Yes  -GC     Patient would benefit from skilled therapy intervention Yes  -GC            PT Plan    PT Frequency 1x/week;2x/week  -GC     Predicted Duration of Therapy Intervention (PT) 4 weeks  -GC     Planned CPT's? PT EVAL LOW COMPLEXITY: 42245;PT THER PROC EA 15 MIN: 29213;PT MANUAL THERAPY EA 15 MIN: 04614;PT HOT OR COLD PACK TREAT [facility];PT ELECTRICAL STIM UNATTEND:   -     PT Plan Comments Pt is to continue his HEP 2x daily.  -GC           User Key  (r) = Recorded By, (t) = Taken By, (c) = Cosigned By    Initials Name Provider Type    GC Mega Yip, PT Physical Therapist                 Modalities     Row Name 03/21/22 0900             Moist Heat    MH Applied Yes  -GC      Location LS spine with IFC with pt prone over 1 pillow  -GC      MH Prior to Rx Yes  -GC              ELECTRICAL STIMULATION    Attended/Unattended Unattended  -      Stimulation Type IFC  -GC      Location/Electrode Placement/Other LS spine with MH with pt prone over 1 pillow  -GC      PT E-Stim Unattended Minutes 15  -GC            User Key  (r) = Recorded By, (t) = Taken By, (c) = Cosigned By    Initials Name Provider Type    GC Mega Yip, PT Physical Therapist               OP Exercises     Row Name 03/21/22 0900             Exercise 1    Exercise Name 1 AP mobilizations L3,L4,L5  -      Cueing 1 Verbal;Tactile  -GC      Time 1 3 min  -GC              Exercise 2    Exercise  Name 2 Press ups with overpressure at L3, L4, L5  -GC      Cueing 2 Verbal;Tactile  -GC      Time 2 5 min  -GC              Exercise 3    Exercise Name 3 Hamstring stretch-bilateral  -GC      Cueing 3 Verbal;Tactile  -GC      Reps 3 10  -GC      Time 3 10 secs  -GC              Exercise 4    Exercise Name 4 Piriformis stretch-bilateral  -GC      Cueing 4 Verbal;Tactile  -GC      Reps 4 10  -GC      Time 4 10 secs  -GC            User Key  (r) = Recorded By, (t) = Taken By, (c) = Cosigned By    Initials Name Provider Type     Mega Yip, PT Physical Therapist                                        Time Calculation:     Start Time: 0900  Stop Time: 1002  Time Calculation (min): 62 min  Untimed Charges  PT E-Stim Unattended Minutes: 15  Total Minutes  Untimed Charges Total Minutes: 15   Total Minutes: 15     Therapy Charges for Today     Code Description Service Date Service Provider Modifiers Qty    81711021105 HC PT EVAL LOW COMPLEXITY 3 3/21/2022 Mega Yip, PT GP 1                    Mega Yip PT  3/21/2022

## 2022-03-25 ENCOUNTER — HOSPITAL ENCOUNTER (OUTPATIENT)
Dept: PHYSICAL THERAPY | Facility: HOSPITAL | Age: 32
Setting detail: THERAPIES SERIES
Discharge: HOME | End: 2022-03-25

## 2022-03-25 DIAGNOSIS — S39.012A STRAIN OF LUMBAR REGION, INITIAL ENCOUNTER: Primary | ICD-10-CM

## 2022-03-25 PROCEDURE — G0283 ELEC STIM OTHER THAN WOUND: HCPCS | Performed by: PHYSICAL THERAPIST

## 2022-03-25 PROCEDURE — 97110 THERAPEUTIC EXERCISES: CPT | Performed by: PHYSICAL THERAPIST

## 2022-03-25 NOTE — THERAPY TREATMENT NOTE
Outpatient Physical Therapy Ortho Treatment Note  [facility]     Patient Name: Uriel Mathew  : 1990  MRN: [id number]  Today's Date: 3/25/2022      Visit Date: 2022    Visit Dx:    ICD-10-CM ICD-9-CM   1. Strain of lumbar region, initial encounter  S39.012A 847.2       There is no problem list on file for this patient.       No past medical history on file.     Past Surgical History:   Procedure Laterality Date   • INGUINAL HERNIA REPAIR Left 2003   • TONSILLECTOMY AND ADENOIDECTOMY                          PT Assessment/Plan     Row Name 22 0830          PT Assessment    Assessment Comments Pt is doing well with decreased pain and good tolerance to his exercise progression.  -GC            PT Plan    PT Plan Comments Pt is to continue his HEP 2x daily.  -GC           User Key  (r) = Recorded By, (t) = Taken By, (c) = Cosigned By    Initials Name Provider Type    GC Mega Yip, PT Physical Therapist                 Modalities     Row Name 22 0830             Subjective Comments    Subjective Comments Pt states his back is feeling better.  -GC              Moist Heat    MH Applied Yes  -GC      Location LS spine with IFC with pt prone over 1 pillow  -GC      MH Prior to Rx Yes  -GC              ELECTRICAL STIMULATION    Attended/Unattended Unattended  -GC      Stimulation Type IFC  -GC      Location/Electrode Placement/Other LS spine with MH with pt prone over 1 pillow  -GC      PT E-Stim Unattended Minutes 15  -GC            User Key  (r) = Recorded By, (t) = Taken By, (c) = Cosigned By    Initials Name Provider Type    GC Mega Yip, PT Physical Therapist               OP Exercises     Row Name 22 0830             Subjective Comments    Subjective Comments Pt states his back is feeling better.  -GC              Exercise 1    Exercise Name 1 AP mobilizations L3,L4,L5  -GC      Cueing 1 Verbal;Tactile  -GC      Time 1 3 min  -GC              Exercise 2     Exercise Name 2 Press ups with overpressure at L3, L4, L5  -GC      Cueing 2 Verbal;Tactile  -GC      Time 2 5 min  -GC              Exercise 3    Exercise Name 3 Hamstring stretch-bilateral  -GC      Cueing 3 Verbal;Tactile  -GC      Reps 3 10  -GC      Time 3 10 secs  -GC              Exercise 4    Exercise Name 4 Piriformis stretch-bilateral  -GC      Cueing 4 Verbal;Tactile  -GC      Reps 4 10  -GC      Time 4 10 secs  -GC              Exercise 5    Exercise Name 5 Chest raise prone over 1 pillow  -GC      Cueing 5 Verbal;Tactile  -GC      Reps 5 25  -GC              Exercise 6    Exercise Name 6 Prone hip EXT- alterantely over 1 pillow  -GC      Cueing 6 Verbal;Tactile  -GC      Reps 6 30  -GC            User Key  (r) = Recorded By, (t) = Taken By, (c) = Cosigned By    Initials Name Provider Type     Mega Yip, PT Physical Therapist                                                Time Calculation:   Start Time: 0830  Stop Time: 0921  Time Calculation (min): 51 min  Untimed Charges  PT E-Stim Unattended Minutes: 15  Total Minutes  Untimed Charges Total Minutes: 15   Total Minutes: 15  Therapy Charges for Today     Code Description Service Date Service Provider Modifiers Qty    04760599099 HC PT ELECTRICAL STIM UNATTENDED 3/25/2022 Mega Yip, PT  1    27110838889 HC PT THER PROC EA 15 MIN 3/25/2022 Mega Yip, PT GP 1                    Mega Yip PT  3/25/2022

## 2022-03-31 ENCOUNTER — APPOINTMENT (OUTPATIENT)
Dept: PHYSICAL THERAPY | Facility: HOSPITAL | Age: 32
End: 2022-03-31

## 2022-04-06 ENCOUNTER — HOSPITAL ENCOUNTER (OUTPATIENT)
Dept: PHYSICAL THERAPY | Facility: HOSPITAL | Age: 32
Setting detail: THERAPIES SERIES
Discharge: HOME | End: 2022-04-06

## 2022-04-06 DIAGNOSIS — S39.012A STRAIN OF LUMBAR REGION, INITIAL ENCOUNTER: Primary | ICD-10-CM

## 2022-04-06 PROCEDURE — 97110 THERAPEUTIC EXERCISES: CPT | Performed by: PHYSICAL THERAPIST

## 2022-04-06 PROCEDURE — G0283 ELEC STIM OTHER THAN WOUND: HCPCS | Performed by: PHYSICAL THERAPIST

## 2022-04-06 NOTE — THERAPY TREATMENT NOTE
Outpatient Physical Therapy Ortho Treatment Note   [place]     Patient Name: Uriel Mathew  : 1990  MRN: [id number]  Today's Date: 2022      Visit Date: 2022    Visit Dx:    ICD-10-CM ICD-9-CM   1. Strain of lumbar region, initial encounter  S39.012A 847.2       There is no problem list on file for this patient.       No past medical history on file.     Past Surgical History:   Procedure Laterality Date   • INGUINAL HERNIA REPAIR Left 2003   • TONSILLECTOMY AND ADENOIDECTOMY          PT Ortho     Row Name 22 0735       Lumbosacral Accessory Motions    PA [place]- L4 WNL  -GC    PA [place]- L5 WNL  -GC    PA glide- Sacral base WNL  -GC       Head/Neck/Trunk    Trunk Extension AROM 75% range  -GC    Trunk Flexion AROM 75% range  -GC    Trunk Lt Lateral Flexion AROM 75% range  -GC    Trunk Rt Lateral Flexion AROM 75% range  -GC    Trunk Lt Rotation AROM WFL  -GC    Trunk Rt Rotation AROM WFL  -GC       MMT Neck/Trunk    Trunk Flexion MMT, Gross Movement (5/5) normal  -GC    Trunk Extension MMT, Gross Movement (5/5) normal  -GC       Lower Extremity Flexibility    Hamstrings Bilateral:;Mildly limited  -GC    Hip Flexors Bilateral:;WNL  -GC    Hip External Rotators Bilateral:;Mildly limited  -GC    Hip Internal Rotators Bilateral:;Mildly limited  -GC    Quadratus Lumborum Bilateral:;Mildly limited  -GC          User Key  (r) = Recorded By, (t) = Taken By, (c) = Cosigned By    Initials Name Provider Type    Mega [place], PT Physical Therapist                             PT Assessment/Plan     Row Name 22 0735          PT Assessment    Assessment Comments Pt has good ROM, good strength, improved flexibilityand his painis well controlled. Do not feel like he requires skilled therapy services any longer.  -GC            PT Plan    PT Plan Comments Pt is to continue his HEP and will call if pain returns.  -GC           User Key  (r) = Recorded By, (t) = Taken By, (c) =  Cosigned By    Initials Name Provider Type    GC Mega Yip, PT Physical Therapist                 Modalities     Row Name 04/06/22 0735             Subjective Comments    Subjective Comments Pt states his back is feeling some better.  -GC              Moist Heat    MH Applied Yes  -GC      Location LS spine with IFC with pt prone over 1 pillow  -GC      MH Prior to Rx Yes  -GC              ELECTRICAL STIMULATION    Attended/Unattended Unattended  -GC      Stimulation Type IFC  -GC      Location/Electrode Placement/Other LS spine with MH with pt prone over 1 pillow  -GC      PT E-Stim Unattended Minutes 15  -GC            User Key  (r) = Recorded By, (t) = Taken By, (c) = Cosigned By    Initials Name Provider Type    GC Mega Yip, PT Physical Therapist               OP Exercises     Row Name 04/06/22 0701             Subjective Comments    Subjective Comments Pt states his back is feeling some better.  -GC              Exercise 1    Exercise Name 1 AP mobilizations L3,L4,L5  -GC      Cueing 1 Verbal;Tactile  -GC      Time 1 3 min  -GC              Exercise 2    Exercise Name 2 Press ups with overpressure at L3, L4, L5  -GC      Cueing 2 Verbal;Tactile  -GC      Time 2 5 min  -GC              Exercise 3    Exercise Name 3 Hamstring stretch-bilateral  -GC      Cueing 3 Verbal;Tactile  -GC      Reps 3 10  -GC      Time 3 10 secs  -GC              Exercise 4    Exercise Name 4 Piriformis stretch-bilateral  -GC      Cueing 4 Verbal;Tactile  -GC      Reps 4 10  -GC      Time 4 10 secs  -GC              Exercise 5    Exercise Name 5 Chest raise prone over 1 pillow  -GC      Cueing 5 Verbal;Tactile  -GC      Reps 5 25  -GC              Exercise 6    Exercise Name 6 Prone hip EXT- alterantely over 1 pillow  -GC      Cueing 6 Verbal;Tactile  -GC      Reps 6 30  -GC            User Key  (r) = Recorded By, (t) = Taken By, (c) = Cosigned By    Initials Name Provider Type    GC Mega Yip, PT Physical Therapist                               PT OP Goals     Row Name 04/06/22 0730          PT Short Term Goals    STG Date to Achieve 04/04/22  -GC     STG 1 Decrease LBP to 3-4/10 with activity.  -GC     STG 1 Progress Met  -GC     STG 2 Increase trunk ROM to at least 75% range all planes with testing.  -GC     STG 2 Progress Met  -GC     STG 3 Increase hamstring, piriformis, and quadratus flexibility to no more than a minimal restriciton with testing.  -GC     STG 3 Progress Met  -GC     STG 4 Pt will be independent with his HEP isseud by this therapist.  -GC     STG 4 Progress Met  -GC            Long Term Goals    LTG Date to Achieve 04/18/22  -GC     LTG 1 Decrease LBP to 0-1/10 with activity.  -GC     LTG 1 Progress Met  -GC     LTG 2 Increase trunk ROM to WFL all planes with testing.  -GC     LTG 2 Progress Partially Met  -GC     LTG 3 Increase hamstring, piriformis, and quadratus flexibility to WFL with testing.  -GC     LTG 3 Progress Partially Met  -GC     LTG 4 Increase trunk strength to 5/5 all planes with testing.  -GC     LTG 4 Progress Met  -GC     LTG 5 Pt will be independent with all ADLs without pain.  -GC     LTG 5 Progress Met  -GC           User Key  (r) = Recorded By, (t) = Taken By, (c) = Cosigned By    Initials Name Provider Type     Mega Yip, PT Physical Therapist                               Time Calculation:   Start Time: 0735  Stop Time: 0821  Time Calculation (min): 46 min  Untimed Charges  PT E-Stim Unattended Minutes: 15  Total Minutes  Untimed Charges Total Minutes: 15   Total Minutes: 15  Therapy Charges for Today     Code Description Service Date Service Provider Modifiers Qty    22680712092 HC PT ELECTRICAL STIM UNATTENDED 4/6/2022 Mega Yip, PT  1    77774701265 HC PT THER PROC EA 15 MIN 4/6/2022 Mega Yip, PT GP 1                    Mega Yip PT  4/6/2022

## 2022-06-15 ENCOUNTER — TELEPHONE (OUTPATIENT)
Dept: INTERNAL MEDICINE | Facility: CLINIC | Age: 32
End: 2022-06-15

## 2022-06-15 NOTE — TELEPHONE ENCOUNTER
Caller: Uriel Mathew    Relationship to patient: Self    Best call back number: [phone]    Chief complaint:       Type of visit:  POSITIVE COVID TEST 6-13-22. LEAVING ON TRIP 6-23-22. WAS ADVISED PATIENT WOULD NEED A DOCUMENT FROM PRIMARY CARE PROVIDER STATING PATIENT IS SAFE TO TRAVEL         Requested date:     If rescheduling, when is the original appointment:     Additional notes:

## 2022-06-15 NOTE — TELEPHONE ENCOUNTER
Let's have him schedule a video visit with one of the providers so we can document time frame of symptoms/resolution of symptoms, etc.

## 2022-06-20 ENCOUNTER — TELEMEDICINE (OUTPATIENT)
Dept: INTERNAL MEDICINE | Facility: CLINIC | Age: 32
End: 2022-06-20

## 2022-06-20 VITALS — HEIGHT: 67 IN | BODY MASS INDEX: 37.67 KG/M2 | WEIGHT: 240 LBS

## 2022-06-20 DIAGNOSIS — U07.1 COVID-19 VIRUS INFECTION: Primary | ICD-10-CM

## 2022-06-20 PROCEDURE — 99212 OFFICE O/P EST SF 10 MIN: CPT | Performed by: INTERNAL MEDICINE

## 2022-06-20 NOTE — PROGRESS NOTES
Uriel Mathew is a 32 y.o. male, who presents with a chief complaint of   Chief Complaint   Patient presents with   • Covid-19 Home Monitoring Video Visit           HPI   This visit has been scheduled as a telehealth visit to comply with patient safety concerns in accordance with CDC recommendations. This was an audio and video enabled telemedicine encounter.    You have chosen to receive care through a televisit visit. Do you consent to use a televisit visit for your medical care today? Yes    Pt had had covid recently and is going on a cruise and needs medical clearance.  He had very mild symptoms.  He had some congestion for a few days and soa x 1 day.  No current fever, congestion or other symptoms.  Sx began on June 11 and he tested positive on June 13.        The following portions of the patient's history were reviewed and updated as appropriate: allergies, current medications, past family history, past medical history, past social history, past surgical history and problem list.    Allergies: Penicillins    Review of Systems   Constitutional: Negative.    HENT: Negative.    Eyes: Negative.    Respiratory: Negative.    Cardiovascular: Negative.    Gastrointestinal: Negative.    Endocrine: Negative.    Genitourinary: Negative.    Musculoskeletal: Negative.    Skin: Negative.    Allergic/Immunologic: Negative.    Neurological: Negative.    Hematological: Negative.    Psychiatric/Behavioral: Negative.    All other systems reviewed and are negative.            Wt Readings from Last 3 Encounters:   06/20/22 109 kg (240 lb)   03/10/22 115 kg (254 lb 9.6 oz)   09/17/21 109 kg (240 lb)     Temp Readings from Last 3 Encounters:   03/10/22 97.8 °F (36.6 °C) (Temporal)   09/17/21 98 °F (36.7 °C) (Infrared)   07/30/21 98.6 °F (37 °C) (Infrared)     BP Readings from Last 3 Encounters:   03/10/22 129/73   09/17/21 140/86   07/30/21 114/70     Pulse Readings from Last 3 Encounters:   03/10/22 86   09/17/21 109    07/30/21 94     Body mass index is 37.59 kg/m².  SpO2 Readings from Last 3 Encounters:   03/10/22 97%   09/17/21 99%   07/30/21 96%          Physical Exam  Vitals and nursing note reviewed.   Constitutional:       General: He is not in acute distress.     Appearance: He is well-developed.   HENT:      Head: Normocephalic and atraumatic.      Right Ear: External ear normal.      Left Ear: External ear normal.      Nose: Nose normal.   Eyes:      Conjunctiva/sclera: Conjunctivae normal.   Pulmonary:      Effort: Pulmonary effort is normal. No respiratory distress.   Musculoskeletal:      Cervical back: Normal range of motion and neck supple.   Skin:     Findings: No rash.   Neurological:      Mental Status: He is alert and oriented to person, place, and time.   Psychiatric:         Behavior: Behavior normal.         Thought Content: Thought content normal.         Judgment: Judgment normal.         Results for orders placed or performed during the hospital encounter of 01/07/22   COVID-19,[facility] ROUTINE, NP/OP SWAB IN TRANSPORT MEDIA OR ESWAB 72 HR TAT - Swab, Nasopharynx    Specimen: Nasopharynx; Swab   Result Value Ref Range    SARS-CoV-2, JOAO Not Detected Not Detected   SARS-CoV-2, JOAO 2 DAY TAT - Swab, Nasopharynx    Specimen: Nasopharynx; Swab   Result Value Ref Range    [facility] SARS-COV-2, JOAO 2 DAY TAT Performed      Result Review :                  Assessment and Plan    Diagnoses and all orders for this visit:    1. COVID-19 virus infection (Primary)     pt improving and is cleared for trave on 6/23/22.  Letter sent to patient at time of visit through my chart.       I spent 13 minutes caring for Uriel on this date of service. This time includes time spent by me in the following activities:preparing for the visit, reviewing tests, performing a medically appropriate examination and/or evaluation , counseling and educating the patient/family/caregiver and documenting information in the medical  record      Outpatient Medications Prior to Visit   Medication Sig Dispense Refill   • methylPREDNISolone (MEDROL) 4 MG dose pack Take as directed on package instructions. 21 each 0   • ProAir  (90 Base) MCG/ACT inhaler        No facility-administered medications prior to visit.     No orders of the defined types were placed in this encounter.    [unfilled]  Medications Discontinued During This Encounter   Medication Reason   • ProAir  (90 Base) MCG/ACT inhaler *Therapy completed   • methylPREDNISolone (MEDROL) 4 MG dose pack *Therapy completed         Return if symptoms worsen or fail to improve.    Patient was given instructions and counseling regarding her condition or for health maintenance advice. Please see specific information pulled into the AVS if appropriate.

## 2022-08-04 ENCOUNTER — OFFICE VISIT (OUTPATIENT)
Dept: INTERNAL MEDICINE | Facility: CLINIC | Age: 32
End: 2022-08-04

## 2022-08-04 VITALS
SYSTOLIC BLOOD PRESSURE: 120 MMHG | TEMPERATURE: 98.2 F | OXYGEN SATURATION: 99 % | BODY MASS INDEX: 39.87 KG/M2 | WEIGHT: 254 LBS | DIASTOLIC BLOOD PRESSURE: 70 MMHG | HEIGHT: 67 IN | HEART RATE: 89 BPM

## 2022-08-04 DIAGNOSIS — N50.812 PAIN IN LEFT TESTICLE: Primary | ICD-10-CM

## 2022-08-04 DIAGNOSIS — R10.32 LLQ ABDOMINAL PAIN: ICD-10-CM

## 2022-08-04 LAB
BILIRUB BLD-MCNC: NEGATIVE MG/DL
CLARITY, POC: CLEAR
COLOR UR: YELLOW
EXPIRATION DATE: NORMAL
GLUCOSE UR STRIP-MCNC: NEGATIVE MG/DL
KETONES UR QL: NEGATIVE
LEUKOCYTE EST, POC: NEGATIVE
Lab: NORMAL
NITRITE UR-MCNC: NEGATIVE MG/ML
PH UR: 5.5 [PH] (ref 5–8)
PROT UR STRIP-MCNC: NEGATIVE MG/DL
RBC # UR STRIP: NEGATIVE /UL
SP GR UR: 1.03 (ref 1–1.03)
UROBILINOGEN UR QL: NORMAL

## 2022-08-04 PROCEDURE — 99214 OFFICE O/P EST MOD 30 MIN: CPT | Performed by: NURSE PRACTITIONER

## 2022-08-04 RX ORDER — CIPROFLOXACIN 500 MG/1
500 TABLET, FILM COATED ORAL 2 TIMES DAILY
Qty: 28 TABLET | Refills: 0 | Status: SHIPPED | OUTPATIENT
Start: 2022-08-04 | End: 2022-08-18

## 2022-08-04 RX ORDER — MELOXICAM 15 MG/1
15 TABLET ORAL DAILY
Qty: 14 TABLET | Refills: 0 | Status: SHIPPED | OUTPATIENT
Start: 2022-08-04 | End: 2022-08-18

## 2022-08-04 NOTE — PROGRESS NOTES
"Uriel Mathew is a 32 y.o. male presenting today for   Chief Complaint   Patient presents with   • Abdominal Pain     Pain in lower abdominal and testicles. He thinks he lifted something too heavy at work yesterday. He has had 2 hernias during childhood. Pain started yesterday but worsened [place]. Pain comes and goes, about a 4 or 5 on scale, states it's like a dull ache.          Subjective    History of Present Illness     1 day of LLQ abd pain. Intermittent. Lasting hours. No aggravating factors. No alleviating. Dull. 4-5/10 VPS. Radiating to L testicle. 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 No OTCs or home remedies.    The following portions of the patient's history were reviewed and updated as appropriate: allergies, current medications, problem list, past medical history, past surgical history, family history, and social history.    Review of Systems   Constitutional: Negative for fever.   Gastrointestinal: Positive for abdominal pain. Negative for blood in stool, constipation, diarrhea, nausea and vomiting.   Genitourinary: Negative for difficulty urinating, dysuria and hematuria.        No hernia         Objective    Vitals:    08/04/22 1444 08/04/22 1506   BP: 140/78 120/70   Pulse: 89    Temp: 98.2 °F (36.8 °C)    SpO2: 99%    Weight: 115 kg (254 lb)    Height: 170.2 cm (67\")      Body mass index is 39.78 kg/m².  Nursing notes and vitals reviewed.    Physical Exam  Constitutional:       General: He is not in acute distress.     Appearance: He is well-developed.   Cardiovascular:      Rate and Rhythm: Regular rhythm.      Heart sounds: Normal heart sounds.   Pulmonary:      Effort: Pulmonary effort is normal.      Breath sounds: Normal breath sounds.   Abdominal:      Hernia: There is no hernia in the left inguinal area.   Genitourinary:     Penis: Normal.       Testes:         Right: Mass not present.         Left: Tenderness " present. Mass or swelling not present. Left testis is descended.      Epididymis:      Left: Tenderness present. No mass.   Neurological:      Mental Status: He is alert and oriented to person, place, and time.   Psychiatric:         Attention and Perception: He is attentive.         Mood and Affect: Mood and affect normal.         Speech: Speech normal.         Behavior: Behavior normal.         Thought Content: Thought content normal.       Recent Results (from the past 24 hour(s))   POC Urinalysis Dipstick, Automated    Collection Time: 08/04/22  3:57 PM    Specimen: Urine   Result Value Ref Range    Color Yellow Yellow, Straw, Dark Yellow, Jyoti    Clarity, UA Clear Clear    Specific Gravity  1.030 1.005 - 1.030    pH, Urine 5.5 5.0 - 8.0    Leukocytes Negative Negative    Nitrite, UA Negative Negative    Protein, POC Negative Negative mg/dL    Glucose, UA Negative Negative mg/dL    Ketones, UA Negative Negative    Urobilinogen, UA Normal Normal    Bilirubin Negative Negative    Blood, UA Negative Negative    Lot Number [id number]     Expiration Date 2/28/23          Assessment and Plan    Diagnoses and all orders for this visit:    1. Pain in left testicle (Primary)  -     POC Urinalysis Dipstick, Automated  -     Urine Culture - , Urine, Clean Catch  -     ciprofloxacin (Cipro) 500 MG tablet; Take 1 tablet by mouth 2 (Two) Times a Day for 14 days.  Dispense: 28 tablet; Refill: 0  -     meloxicam (MOBIC) 15 MG tablet; Take 1 tablet by mouth Daily for 14 days.  Dispense: 14 tablet; Refill: 0    2. LLQ abdominal pain  -     POC Urinalysis Dipstick, Automated  -     Urine Culture - , Urine, Clean Catch  -     ciprofloxacin (Cipro) 500 MG tablet; Take 1 tablet by mouth 2 (Two) Times a Day for 14 days.  Dispense: 28 tablet; Refill: 0  -     meloxicam (MOBIC) 15 MG tablet; Take 1 tablet by mouth Daily for 14 days.  Dispense: 14 tablet; Refill: 0        ?Epidydimitis vs soft tissue strain vs scar tissue from previous  hernia repair?  Tx w/ abx and antiinflammatory. Will US w/ any worsening S&S.    Medications, including side effects, were discussed with the patient. Patient verbalized understanding.  The plan of care was discussed. All questions were answered. Patient verbalized understanding.        Return if symptoms worsen or fail to improve.

## 2022-08-06 LAB
BACTERIA UR CULT: NO GROWTH
BACTERIA UR CULT: NORMAL

## 2024-05-16 ENCOUNTER — OFFICE VISIT (OUTPATIENT)
Dept: INTERNAL MEDICINE | Facility: CLINIC | Age: 34
End: 2024-05-16
Payer: COMMERCIAL

## 2024-05-16 VITALS
HEART RATE: 82 BPM | TEMPERATURE: 98.6 F | BODY MASS INDEX: 39.93 KG/M2 | SYSTOLIC BLOOD PRESSURE: 114 MMHG | WEIGHT: 254.4 LBS | OXYGEN SATURATION: 96 % | DIASTOLIC BLOOD PRESSURE: 76 MMHG | HEIGHT: 67 IN

## 2024-05-16 DIAGNOSIS — B35.1 ONYCHOMYCOSIS: ICD-10-CM

## 2024-05-16 DIAGNOSIS — Z00.00 ENCOUNTER FOR WELLNESS EXAMINATION IN ADULT: Primary | ICD-10-CM

## 2024-05-16 PROCEDURE — 99395 PREV VISIT EST AGE 18-39: CPT | Performed by: NURSE PRACTITIONER

## 2024-05-16 RX ORDER — CICLOPIROX 80 MG/ML
SOLUTION TOPICAL NIGHTLY
Qty: 6 ML | Refills: 11 | Status: SHIPPED | OUTPATIENT
Start: 2024-05-16

## 2024-05-16 NOTE — PATIENT INSTRUCTIONS
Healthy Lifestyle: Nutrition and Exercise    How you nourish your body a critical to your overall health and well being. It is often said that food can be our most powerful medicine or most toxic poison depending on the choices we make.      Meal planning    It is important to plan your meals ahead of time. You will make better choices, instead of ordering a pizza or grabbing a bag of chips when you are starving.          At meals, imagine dividing your plate into fourths:  One-half of your plate is low-carbohydrate vegetables.  One-fourth of your plate is complex carbohydrates - whole grains and/or fruits.  One-fourth of your plate is good quality lean protein.      Meal planning  Eat 3 meals and 2 snacks each day.  Eat at set times. Allow at least 30 minutes for each meal.  Limit carbohydrate intake to no more than 30 g per meal or 130 g per day.   Include a protein-rich food at every meal and snack. Eat 60-80 g of protein a day when possible.  EAT SLOWLY!  Take small bites.  Set your fork or spoon down between each bite and fully chew your food.  When you feel like the next bite will make you full, STOP EATING.  Eat your protein first. If you are still hungry after you have consumed your protein, then move on to eating your low-carbohydrate vegetable. If, after consuming both your protein and low carbohydrate vegetable, you are still hungry, then move on to your complex carbohydrate.      Cooking  Use low-fat cooking methods, such as baking, broiling, boiling, or grilling.  Cook using healthy fats, such as olive, sunflower, grapeseed, or avocado oil.  Avoid adding extra salt, sugar, or fat to foods when cooking.        General instructions  Stay hydrated! Drink at least 48-64 oz of water each day.  For every 8 ounces of coffee or tea you drink, you must consume an additional 8 ounces of water beyond the recommended 48-64 ounces to offset the diuretic effect.  DO NOT DRINK BEVERAGES WITH  CALORIES.  Limit/avoid alcohol intake.  Avoid foods that are high in fat or have added sugar.  Take any vitamin supplements as directed by your health care provider.      Exercise  Get 30-45 continuous minutes of aerobic/cardio activity 5-6 days per week.  If you are not exercising at all, start with 15-20 minutes 3 days per week and gradually build up.

## 2024-05-16 NOTE — PROGRESS NOTES
"ANUP Trevino is a 34 y.o. male presenting for Annual Exam and Nail Problem (Right foot, has yellow toe nail)    His current/chronic health conditions include:  There is no problem list on file for this patient.      Outpatient Medications Marked as Taking for the 5/16/24 encounter (Office Visit) with Joyce Albert APRN   Medication Sig Dispense Refill    diclofenac (VOLTAREN) 75 MG EC tablet Take 1 tablet by mouth 2 (Two) Times a Day. With food 20 tablet 0    tiZANidine (ZANAFLEX) 2 MG tablet Take 1-2 tablets by mouth Every 8 (Eight) Hours As Needed (muscle pain). 20 tablet 0          Health Habits:  Nutrition: \"not great\" \"trying to do better b/c of my son\" \"mostly what is quick and easy\"  Exercise: \"winter wasn't great but trying to do better for the summer\" \"disc golf\" new job at [employer] is sedentary    Screenings:  PSA: n/a  Colon Cancer: n/a  Tob use: former; does not meet LDCT criteria            The patient's allergies, current medications, problem list, past medical history, past family history, and past social history were reviewed and updated as appropriate.        OBJECTIVE    Vitals:    05/16/24 1455   BP: 114/76   BP Location: Left arm   Patient Position: Sitting   Cuff Size: Adult   Pulse: 82   Temp: 98.6 °F (37 °C)   TempSrc: Infrared   SpO2: 96%   Weight: 115 kg (254 lb 6.4 oz)   Height: 170.2 cm (67\")       BP Readings from Last 3 Encounters:   05/16/24 114/76   05/14/24 139/89   08/04/22 120/70       Wt Readings from Last 3 Encounters:   05/16/24 115 kg (254 lb 6.4 oz)   05/14/24 115 kg (254 lb)   08/04/22 115 kg (254 lb)       Body mass index is 39.84 kg/m².  Nursing notes and vital signs reviewed.    Physical Exam  Constitutional:       General: He is not in acute distress.     Appearance: Normal appearance. He is well-developed.   HENT:      Head: Normocephalic.      Right Ear: Hearing, tympanic membrane, ear canal and external ear normal.      Left Ear: Hearing, tympanic " membrane, ear canal and external ear normal.      Nose: Nose normal. No mucosal edema or rhinorrhea.      Mouth/Throat:      Mouth: Mucous membranes are moist.      Pharynx: Oropharynx is clear. Uvula midline.   Eyes:      General: Lids are normal.      Extraocular Movements: Extraocular movements intact.      Conjunctiva/sclera: Conjunctivae normal.      Pupils: Pupils are equal, round, and reactive to light.   Neck:      Thyroid: No thyroid mass or thyromegaly.   Cardiovascular:      Rate and Rhythm: Regular rhythm.      Pulses: Normal pulses.      Heart sounds: S1 normal and S2 normal. No murmur heard.     No friction rub. No gallop.   Pulmonary:      Effort: Pulmonary effort is normal.      Breath sounds: Normal breath sounds. No wheezing, rhonchi or rales.   Abdominal:      General: Bowel sounds are normal.      Palpations: Abdomen is soft.      Tenderness: There is no abdominal tenderness. There is no guarding.      Hernia: No hernia is present.   Musculoskeletal:         General: No deformity. Normal range of motion.      Cervical back: Normal range of motion and neck supple.        Feet:    Lymphadenopathy:      Cervical: No cervical adenopathy.   Skin:     General: Skin is warm and dry.      Findings: No lesion or rash.   Neurological:      General: No focal deficit present.      Mental Status: He is alert and oriented to person, place, and time.      Cranial Nerves: No cranial nerve deficit.      Sensory: No sensory deficit.      Motor: Motor function is intact.      Coordination: Coordination is intact.      Gait: Gait normal.      Deep Tendon Reflexes: Reflexes are normal and symmetric.   Psychiatric:         Attention and Perception: He is attentive.         Mood and Affect: Mood and affect normal.         Speech: Speech normal.         Behavior: Behavior normal.         Thought Content: Thought content normal.           No results found for this or any previous visit (from the past 672  hour(s)).      ASSESSMENT AND PLAN    Diagnoses and all orders for this visit:    1. Encounter for wellness examination in adult (Primary)  -     CBC (No Diff)  -     Comprehensive Metabolic Panel  -     Hepatitis C Antibody  -     Lipid Panel With / Chol / HDL Ratio  -     TSH    2. Onychomycosis  -     ciclopirox (PENLAC) 8 % solution; Apply  topically to the appropriate area as directed Every Night.  Dispense: 6 mL; Refill: 11          Preventative counseling completed including relevant screenings, appropriate vaccinations, healthy nutrition, and appropriate physical activity. Age-appropriate Screening & Interventions recommended by USPSTF were reviewed with the patient, and Health Maintenance was updated in Epic.  Class 2 Severe Obesity (BMI >=35 and <=39.9). Obesity-related health conditions include the following: none. Obesity is unchanged. BMI is is above average; BMI management plan is completed. We discussed low calorie, low carb based diet program, increasing exercise, and Information on healthy weight added to patient's after visit summary.            Medications, including side effects, were discussed with the patient. Patient verbalized understanding.  The plan of care was discussed. All questions were answered. Patient verbalized understanding.        Return in about 1 year (around 5/16/2025) for fasting labs one week prior to, Annual physical.

## 2024-05-17 LAB
ALBUMIN SERPL-MCNC: 4.3 G/DL (ref 3.5–5.2)
ALBUMIN/GLOB SERPL: 1.4 G/DL
ALP SERPL-CCNC: 98 U/L (ref 39–117)
ALT SERPL-CCNC: 58 U/L (ref 1–41)
AST SERPL-CCNC: 25 U/L (ref 1–40)
BILIRUB SERPL-MCNC: 0.3 MG/DL (ref 0–1.2)
BUN SERPL-MCNC: 13 MG/DL (ref 6–20)
BUN/CREAT SERPL: 14.4 (ref 7–25)
CALCIUM SERPL-MCNC: 9.4 MG/DL (ref 8.6–10.5)
CHLORIDE SERPL-SCNC: 104 MMOL/L (ref 98–107)
CHOLEST SERPL-MCNC: 233 MG/DL (ref 0–200)
CHOLEST/HDLC SERPL: 8.32 {RATIO}
CO2 SERPL-SCNC: 23.9 MMOL/L (ref 22–29)
CREAT SERPL-MCNC: 0.9 MG/DL (ref 0.76–1.27)
EGFRCR SERPLBLD CKD-EPI 2021: 115.7 ML/MIN/1.73
ERYTHROCYTE [DISTWIDTH] IN BLOOD BY AUTOMATED COUNT: 12.9 % (ref 12.3–15.4)
GLOBULIN SER CALC-MCNC: 3.1 GM/DL
GLUCOSE SERPL-MCNC: 86 MG/DL (ref 65–99)
HCT VFR BLD AUTO: 41.7 % (ref 37.5–51)
HCV IGG SERPL QL IA: NON REACTIVE
HDLC SERPL-MCNC: 28 MG/DL (ref 40–60)
HGB BLD-MCNC: 13.8 G/DL (ref 13–17.7)
LDLC SERPL CALC-MCNC: 178 MG/DL (ref 0–100)
MCH RBC QN AUTO: 26.6 PG (ref 26.6–33)
MCHC RBC AUTO-ENTMCNC: 33.1 G/DL (ref 31.5–35.7)
MCV RBC AUTO: 80.5 FL (ref 79–97)
PLATELET # BLD AUTO: 309 10*3/MM3 (ref 140–450)
POTASSIUM SERPL-SCNC: 4.4 MMOL/L (ref 3.5–5.2)
PROT SERPL-MCNC: 7.4 G/DL (ref 6–8.5)
RBC # BLD AUTO: 5.18 10*6/MM3 (ref 4.14–5.8)
SODIUM SERPL-SCNC: 139 MMOL/L (ref 136–145)
TRIGL SERPL-MCNC: 146 MG/DL (ref 0–150)
TSH SERPL DL<=0.005 MIU/L-ACNC: 1.9 UIU/ML (ref 0.27–4.2)
VLDLC SERPL CALC-MCNC: 27 MG/DL (ref 5–40)
WBC # BLD AUTO: 7.67 10*3/MM3 (ref 3.4–10.8)

## 2024-05-20 ENCOUNTER — TELEPHONE (OUTPATIENT)
Dept: INTERNAL MEDICINE | Facility: CLINIC | Age: 34
End: 2024-05-20
Payer: COMMERCIAL

## 2024-05-20 NOTE — TELEPHONE ENCOUNTER
"    Name: Uriel Mathew \"Herson\"    Relationship: Self    Best Callback Number: [phone]   HUB PROVIDED THE RELAY MESSAGE FROM THE OFFICE   PATIENT HAS FURTHER QUESTIONS AND WOULD LIKE A CALL BACK AT THE FOLLOWING PHONE NUMBER [phone]    ADDITIONAL INFORMATION: PATIENT WOULD LIKE TO DISCUSS OTHER LAB RESULTS INCLUDING ABDULAZIZ RESULTS    PLEASE ADVISE    "

## 2024-05-20 NOTE — TELEPHONE ENCOUNTER
Pt is going to call back for results    Relay: Please call pt. LDL/bad cholesterol is very high, 178 when upper limit of normal is 100. Good cholesterol is very low. Only 28 when lower limit for normal is 40. These abnormalities indicate a need for significant lifestyle change. He needs to reduce the fats, carbs, and sugars in his diet and exercise 150 minutes each week. We need to plan to recheck this in one year.

## 2024-05-20 NOTE — TELEPHONE ENCOUNTER
Spoke to pt, pcp not concerned about ALT specifically at this time, elevation is likely due to cholesterol.